# Patient Record
Sex: MALE | Race: WHITE | URBAN - METROPOLITAN AREA
[De-identification: names, ages, dates, MRNs, and addresses within clinical notes are randomized per-mention and may not be internally consistent; named-entity substitution may affect disease eponyms.]

---

## 2024-10-09 ENCOUNTER — OFFICE VISIT (OUTPATIENT)
Age: 19
End: 2024-10-09

## 2024-10-09 VITALS
HEART RATE: 73 BPM | TEMPERATURE: 98.3 F | DIASTOLIC BLOOD PRESSURE: 68 MMHG | SYSTOLIC BLOOD PRESSURE: 112 MMHG | WEIGHT: 167 LBS | OXYGEN SATURATION: 98 %

## 2024-10-09 DIAGNOSIS — J01.10 ACUTE FRONTAL SINUSITIS, RECURRENCE NOT SPECIFIED: Primary | ICD-10-CM

## 2024-10-09 RX ORDER — PREDNISONE 20 MG/1
20 TABLET ORAL DAILY
Qty: 5 TABLET | Refills: 0 | Status: SHIPPED | OUTPATIENT
Start: 2024-10-09 | End: 2024-10-14

## 2024-10-09 RX ORDER — SERTRALINE HYDROCHLORIDE 100 MG/1
TABLET, FILM COATED ORAL
COMMUNITY
Start: 2024-07-17

## 2024-10-09 ASSESSMENT — ENCOUNTER SYMPTOMS: COUGH: 1

## 2024-10-09 NOTE — PROGRESS NOTES
Joni Fisher (:  2005) is a 19 y.o. male,New patient, here for evaluation of the following chief complaint(s):  Cough and Pharyngitis (Symptoms for one week.)      ASSESSMENT/PLAN:    ICD-10-CM    1. Acute frontal sinusitis, recurrence not specified  J01.10 amoxicillin-clavulanate (AUGMENTIN) 875-125 MG per tablet     predniSONE (DELTASONE) 20 MG tablet          Dx Disposition: sinusitis  Education and handout provided on diagnosis and management of symptoms.   AVS reviewed with patient. Follow up as needed in UC or with PCP for new or worsening symptoms.   Return if symptoms worsen or fail to improve.    SUBJECTIVE/OBJECTIVE:  Patient presents today with complaints of cough and sore throat that started a week ago and today had blood tinge in phlem      History provided by:  Patient   used: No    Cough    Pharyngitis  Associated symptoms: cough        Vitals:    10/09/24 0931   BP: 112/68   Site: Right Upper Arm   Position: Sitting   Cuff Size: Medium Adult   Pulse: 73   Temp: 98.3 °F (36.8 °C)   TempSrc: Oral   SpO2: 98%   Weight: 75.8 kg (167 lb)       Review of Systems   Respiratory:  Positive for cough.        Physical Exam  Constitutional:       Appearance: Normal appearance. He is normal weight.   HENT:      Head: Normocephalic.      Right Ear: Tympanic membrane is bulging.      Left Ear: Tympanic membrane is bulging.      Nose: Nose normal.      Right Turbinates: Swollen.      Left Turbinates: Swollen.      Mouth/Throat:      Mouth: Mucous membranes are moist.      Pharynx: Oropharynx is clear. Posterior oropharyngeal erythema present.   Cardiovascular:      Rate and Rhythm: Normal rate and regular rhythm.      Heart sounds: Normal heart sounds.   Pulmonary:      Effort: Pulmonary effort is normal.      Breath sounds: Normal breath sounds. No wheezing, rhonchi or rales.   Musculoskeletal:         General: Normal range of motion.      Cervical back: Normal range of motion and

## 2024-11-26 ENCOUNTER — OFFICE VISIT (OUTPATIENT)
Age: 19
End: 2024-11-26

## 2024-11-26 VITALS
HEART RATE: 70 BPM | TEMPERATURE: 98.2 F | HEIGHT: 72 IN | OXYGEN SATURATION: 94 % | DIASTOLIC BLOOD PRESSURE: 74 MMHG | BODY MASS INDEX: 22.65 KG/M2 | SYSTOLIC BLOOD PRESSURE: 110 MMHG

## 2024-11-26 DIAGNOSIS — S61.315A LACERATION OF LEFT RING FINGER WITHOUT FOREIGN BODY WITH DAMAGE TO NAIL, INITIAL ENCOUNTER: Primary | ICD-10-CM

## 2024-11-26 NOTE — PROGRESS NOTES
Joni Fisher (:  2005) is a 19 y.o. male,New patient, here for evaluation of the following chief complaint(s):  Finger Laceration (Pt. Cut right ring finger today at work x 1 hours ago, cut part of finger nail and skin underneath)      ASSESSMENT/PLAN:    ICD-10-CM    1. Laceration of left ring finger without foreign body with damage to nail, initial encounter  S61.315A           Dx Disposition: nail laceration  Education and handout provided on diagnosis and management of symptoms.   AVS reviewed with patient. Follow up as needed in UC or with PCP for new or worsening symptoms.   Return if symptoms worsen or fail to improve.    SUBJECTIVE/OBJECTIVE:  Patient presents today with complaints of laceration to left finger that started 2 hours ago when chopping vegetables      History provided by:  Patient   used: No        Vitals:    24 1531   BP: 110/74   Site: Right Upper Arm   Position: Sitting   Cuff Size: Large Adult   Pulse: 70   Temp: 98.2 °F (36.8 °C)   TempSrc: Oral   SpO2: 94%   Height: 1.829 m (6')       Review of Systems    Physical Exam  Constitutional:       Appearance: Normal appearance.   HENT:      Head: Normocephalic.      Nose: Nose normal.      Mouth/Throat:      Mouth: Mucous membranes are moist.      Pharynx: Oropharynx is clear.   Cardiovascular:      Rate and Rhythm: Normal rate and regular rhythm.      Heart sounds: Normal heart sounds.   Pulmonary:      Effort: Pulmonary effort is normal.      Breath sounds: Normal breath sounds.   Musculoskeletal:         General: Normal range of motion.   Skin:     General: Skin is warm and dry.      Comments: Filet type laceration to left ring finger thru nail no active bleeding noted   Neurological:      Mental Status: He is alert and oriented to person, place, and time.   Psychiatric:         Mood and Affect: Mood normal.           An electronic signature was used to authenticate this note.    --Lon Wesley, CASSANDRA - CNP